# Patient Record
Sex: MALE | Race: WHITE
[De-identification: names, ages, dates, MRNs, and addresses within clinical notes are randomized per-mention and may not be internally consistent; named-entity substitution may affect disease eponyms.]

---

## 2019-04-10 ENCOUNTER — HOSPITAL ENCOUNTER (OUTPATIENT)
Dept: HOSPITAL 92 - RAD-FRANK | Age: 64
Discharge: HOME | End: 2019-04-10
Attending: NURSE PRACTITIONER
Payer: MEDICARE

## 2019-04-10 DIAGNOSIS — J98.4: ICD-10-CM

## 2019-04-10 DIAGNOSIS — R53.83: ICD-10-CM

## 2019-04-10 DIAGNOSIS — R05: Primary | ICD-10-CM

## 2019-04-10 PROCEDURE — 71046 X-RAY EXAM CHEST 2 VIEWS: CPT

## 2019-04-10 NOTE — RAD
EXAM:

Two views chest



PROVIDED CLINICAL HISTORY:

Cough



COMPARISON:

12/4/2012



FINDINGS:

Cardiac and mediastinal silhouette appears within normal limits. Patchy airspace disease left midlung
 zone on the frontal view. No pleural fluid or pneumothorax apparent.



IMPRESSION:

Patchy left midlung zone airspace disease, compatible with pneumonia in the appropriate clinical cont
ext. Follow-up after treatment is recommended to document resolution.



Reported By: Efren Armendariz 

Electronically Signed:  4/10/2019 10:34 AM

## 2019-04-22 ENCOUNTER — HOSPITAL ENCOUNTER (OUTPATIENT)
Dept: HOSPITAL 92 - RAD-FRANK | Age: 64
Discharge: HOME | End: 2019-04-22
Attending: NURSE PRACTITIONER
Payer: MEDICARE

## 2019-04-22 DIAGNOSIS — J18.1: Primary | ICD-10-CM

## 2019-04-22 PROCEDURE — 71046 X-RAY EXAM CHEST 2 VIEWS: CPT

## 2019-04-22 NOTE — RAD
Exam: Chest 2 views



HISTORY:Cough



Comparison: 4/10/2019



FINDINGS:

Prior patchy left perihilar opacity has decreased

Lungs: No masses or consolidation.



Cardiac silhouette:Stable

Pulmonary vessels: Normal

Pleural Spaces: Clear

Pneumothorax: None



Osseous abnormalities: None of acuity.



IMPRESSION: No focal consolidation.



Improvement of prior left patchy perihilar opacity.





Reported By: Robert [email or web address] 

Electronically Signed:  4/22/2019 4:02 PM